# Patient Record
Sex: FEMALE | Race: ASIAN | NOT HISPANIC OR LATINO | Employment: FULL TIME | URBAN - METROPOLITAN AREA
[De-identification: names, ages, dates, MRNs, and addresses within clinical notes are randomized per-mention and may not be internally consistent; named-entity substitution may affect disease eponyms.]

---

## 2017-02-09 ENCOUNTER — OFFICE VISIT (OUTPATIENT)
Dept: URGENT CARE | Facility: PHYSICIAN GROUP | Age: 27
End: 2017-02-09
Payer: OTHER GOVERNMENT

## 2017-02-09 VITALS
BODY MASS INDEX: 23.18 KG/M2 | HEART RATE: 73 BPM | SYSTOLIC BLOOD PRESSURE: 110 MMHG | WEIGHT: 115 LBS | HEIGHT: 59 IN | OXYGEN SATURATION: 97 % | RESPIRATION RATE: 12 BRPM | DIASTOLIC BLOOD PRESSURE: 72 MMHG | TEMPERATURE: 97.8 F

## 2017-02-09 DIAGNOSIS — J02.9 PHARYNGITIS, UNSPECIFIED ETIOLOGY: ICD-10-CM

## 2017-02-09 DIAGNOSIS — J03.90 TONSILLITIS: ICD-10-CM

## 2017-02-09 PROCEDURE — 99214 OFFICE O/P EST MOD 30 MIN: CPT | Performed by: NURSE PRACTITIONER

## 2017-02-09 RX ORDER — AMOXICILLIN 875 MG/1
875 TABLET, COATED ORAL 2 TIMES DAILY
Qty: 20 TAB | Refills: 0 | Status: SHIPPED | OUTPATIENT
Start: 2017-02-09 | End: 2017-02-19

## 2017-02-09 ASSESSMENT — ENCOUNTER SYMPTOMS
CHILLS: 0
VOMITING: 0
HEADACHES: 0
MYALGIAS: 0
NAUSEA: 0
SORE THROAT: 1
COUGH: 0
FEVER: 0
SWOLLEN GLANDS: 1

## 2017-02-09 NOTE — PROGRESS NOTES
"Subjective:      Jesus Wyatt is a 26 y.o. female who presents with Pharyngitis    Past medical history: No history of chronic illness    Social history: Nonsmoker    Family history: No other ill family members at home    Patient is a 26-year-old female who presents today with complaint of sore throat, right side worse than left. She's had pain over the last week. She initially try taking ibuprofen for her symptoms and states that they almost resolved. Her symptoms then returned and are worse now. She denies any fevers with this. No other upper respiratory infections including nasal congestion, sinus pain or pressure. She states that she is having right ear pain with this. She has not noted any skin rashes, denies nausea vomiting or diarrhea.        Pharyngitis   This is a new problem. The current episode started in the past 7 days. The problem has been waxing and waning. The pain is worse on the right side. There has been no fever. Associated symptoms include swollen glands. Pertinent negatives include no congestion, coughing, headaches or vomiting. She has tried nothing for the symptoms. The treatment provided no relief.       Review of Systems   Constitutional: Positive for malaise/fatigue. Negative for fever and chills.   HENT: Positive for sore throat. Negative for congestion.    Respiratory: Negative for cough.    Gastrointestinal: Negative for nausea and vomiting.   Musculoskeletal: Negative for myalgias.   Skin: Negative.  Negative for rash.   Neurological: Negative for headaches.          Objective:     /72 mmHg  Pulse 73  Temp(Src) 36.6 °C (97.8 °F)  Resp 12  Ht 1.499 m (4' 11.02\")  Wt 52.164 kg (115 lb)  BMI 23.21 kg/m2  SpO2 97%     Physical Exam   Constitutional: She is oriented to person, place, and time. She appears well-developed and well-nourished. No distress.   HENT:   Right Ear: External ear normal.   Left Ear: External ear normal.   Tonsils are 2+, red, and injected. Right side " is worse than the left   Eyes: Conjunctivae and EOM are normal. Pupils are equal, round, and reactive to light. Right eye exhibits no discharge. Left eye exhibits no discharge. No scleral icterus.   Neck: Normal range of motion. Neck supple.   Cardiovascular: Normal rate and regular rhythm.    Pulmonary/Chest: Effort normal.   Lymphadenopathy:     She has cervical adenopathy.   Neurological: She is alert and oriented to person, place, and time.   Skin: Skin is warm and dry. She is not diaphoretic.   Psychiatric: She has a normal mood and affect. Her behavior is normal.   Vitals reviewed.              Assessment/Plan:     1. Tonsillitis    - amoxicillin (AMOXIL) 875 MG tablet; Take 1 Tab by mouth 2 times a day for 10 days.  Dispense: 20 Tab; Refill: 0  Warm salt water gargles  -Chloraseptic spray or lozenges as needed  -Tylenol or Motrin as needed  Push fluids  Follow-up if symptoms persist or worsen  -  2. Pharyngitis, unspecified etiology    - amoxicillin (AMOXIL) 875 MG tablet; Take 1 Tab by mouth 2 times a day for 10 days.  Dispense: 20 Tab; Refill: 0  Warm salt water gargles  -Chloraseptic spray or lozenges as needed  -Tylenol or Motrin as needed  Push fluids  Follow-up if symptoms persist or worsen  -

## 2017-02-09 NOTE — MR AVS SNAPSHOT
"        Jesus Wyatt   2017 2:20 PM   Office Visit   MRN: [id number]    Department:  [facility]   Dept Phone:  [phone]    Description:  Female : 1990   Provider:  Cathey J Hamman, A.P.N.           Reason for Visit     Pharyngitis swollen tonsils, right ear pain      Allergies as of 2017     No Known Allergies      You were diagnosed with     Tonsillitis   [[id number]]       Pharyngitis, unspecified etiology   [[id number]]         Vital Signs     Blood Pressure Pulse Temperature Respirations Height Weight    110/72 mmHg 73 36.6 °C (97.8 °F) 12 1.499 m (4' 11.02\") 52.164 kg (115 lb)    Body Mass Index Oxygen Saturation                23.21 kg/m2 97%          Basic Information     Date Of Birth Sex Race Ethnicity Preferred Language    1990 Female  Non- English      Health Maintenance        Date Due Completion Dates    IMM HEP B VACCINE (1 of 3 - Primary Series) 1991 ---    IMM HEP A VACCINE (1 of 2 - Standard Series) 1992 ---    IMM HPV VACCINE (1 of 3 - Female 3 Dose Series) 2002 ---    IMM VARICELLA (CHICKENPOX) VACCINE (1 of 2 - 2 Dose Adolescent Series) 2004 ---    IMM DTaP/Tdap/Td Vaccine (1 - Tdap) 2010 ---    PAP SMEAR 2012 ---    IMM INFLUENZA (1) 2016 ---            Current Immunizations     No immunizations on file.      Below and/or attached are the medications your provider expects you to take. Review all of your home medications and newly ordered medications with your provider and/or pharmacist. Follow medication instructions as directed by your provider and/or pharmacist. Please keep your medication list with you and share with your provider. Update the information when medications are discontinued, doses are changed, or new medications (including over-the-counter products) are added; and carry medication information at all times in the event of emergency situations     Allergies:  No Known Allergies          Medications  Valid as " of: February 09, 2017 -  2:36 PM    Generic Name Brand Name Tablet Size Instructions for use    Amoxicillin (Tab) AMOXIL 875 MG Take 1 Tab by mouth 2 times a day for 10 days.        .                 Medicines prescribed today were sent to:     [facility] [address] [place] [address], [place] - [address]    [address] [place] [address]    Phone: [phone] Fax: [phone]    Open 24 Hours?: No      Medication refill instructions:       If your prescription bottle indicates you have medication refills left, it is not necessary to call your provider’s office. Please contact your pharmacy and they will refill your medication.    If your prescription bottle indicates you do not have any refills left, you may request refills at any time through one of the following ways: The online [employer] system (except Urgent Care), by calling your provider’s office, or by asking your pharmacy to contact your provider’s office with a refill request. Medication refills are processed only during regular business hours and may not be available until the next business day. Your provider may request additional information or to have a follow-up visit with you prior to refilling your medication.   *Please Note: Medication refills are assigned a new Rx number when refilled electronically. Your pharmacy may indicate that no refills were authorized even though a new prescription for the same medication is available at the pharmacy. Please request the medicine by name with the pharmacy before contacting your provider for a refill.           [employer] Access Code: [email or web address]  Expires: 3/11/2017  2:36 PM    [employer]  A secure, online tool to manage your health information     [employer]’s [employer]® is a secure, online tool that connects you to your personalized health information from the privacy of your home -- day or night - making it very easy for you to manage your healthcare. Once the activation process is completed, you can even access your  medical information using the Alea bandar, which is available for free in the [employer] Bandar store or Google Play store.     Alea provides the following levels of access (as shown below):   My Chart Features   [facility] Doctor [facility]  Specialists [facility]  Urgent  Care Non-[facility]  Primary Care  Doctor   Email your healthcare team securely and privately 24/7 X X X    Manage appointments: schedule your next appointment; view details of past/upcoming appointments X      Request prescription refills. X      View recent personal medical records, including lab and immunizations X X X X   View health record, including health history, allergies, medications X X X X   Read reports about your outpatient visits, procedures, consult and ER notes X X X X   See your discharge summary, which is a recap of your hospital and/or ER visit that includes your diagnosis, lab results, and care plan. X X       How to register for Alea:  1. Go to  [email or web address].  2. Click on the Sign Up Now box, which takes you to the New Member Sign Up page. You will need to provide the following information:  a. Enter your Alea Access Code exactly as it appears at the top of this page. (You will not need to use this code after you’ve completed the sign-up process. If you do not sign up before the expiration date, you must request a new code.)   b. Enter your date of birth.   c. Enter your home email address.   d. Click Submit, and follow the next screen’s instructions.  3. Create a Alea ID. This will be your Alea login ID and cannot be changed, so think of one that is secure and easy to remember.  4. Create a Alea password. You can change your password at any time.  5. Enter your Password Reset Question and Answer. This can be used at a later time if you forget your password.   6. Enter your e-mail address. This allows you to receive e-mail notifications when new information is available in Alea.  7. Click Sign Up. You  can now view your health information.    For assistance activating your [employer] account, call [phone]

## 2017-09-28 DIAGNOSIS — B18.1 CHRONIC TYPE B VIRAL HEPATITIS (HCC): ICD-10-CM

## 2017-09-28 DIAGNOSIS — D56.3 BETA THALASSEMIA TRAIT: ICD-10-CM

## 2017-10-02 ENCOUNTER — TELEPHONE (OUTPATIENT)
Dept: HEMATOLOGY ONCOLOGY | Facility: MEDICAL CENTER | Age: 27
End: 2017-10-02

## 2017-10-02 NOTE — TELEPHONE ENCOUNTER
1st attempt to contact the patient-Left voicemail for patient requesting a return call to schedule New Hematology appointment with Dr. Morris. Ref: Rabia Keller Dx: Beta thalassemia trait/ elevated iron serum

## 2017-10-18 ENCOUNTER — HOSPITAL ENCOUNTER (OUTPATIENT)
Dept: LAB | Facility: MEDICAL CENTER | Age: 27
End: 2017-10-18
Attending: NURSE PRACTITIONER
Payer: OTHER GOVERNMENT

## 2017-10-18 LAB
HCG SERPL QL: NEGATIVE
INR PPP: 1 (ref 0.87–1.13)
PLATELET # BLD AUTO: 346 K/UL (ref 164–446)
PROTHROMBIN TIME: 13.5 SEC (ref 12–14.6)

## 2017-10-18 PROCEDURE — 84703 CHORIONIC GONADOTROPIN ASSAY: CPT

## 2017-10-18 PROCEDURE — 85049 AUTOMATED PLATELET COUNT: CPT

## 2017-10-18 PROCEDURE — 36415 COLL VENOUS BLD VENIPUNCTURE: CPT

## 2017-10-18 PROCEDURE — 85610 PROTHROMBIN TIME: CPT

## 2017-10-19 ENCOUNTER — APPOINTMENT (OUTPATIENT)
Dept: RADIOLOGY | Facility: MEDICAL CENTER | Age: 27
End: 2017-10-19
Attending: INTERNAL MEDICINE
Payer: OTHER GOVERNMENT

## 2017-10-19 ENCOUNTER — HOSPITAL ENCOUNTER (OUTPATIENT)
Facility: MEDICAL CENTER | Age: 27
End: 2017-10-19
Attending: INTERNAL MEDICINE | Admitting: INTERNAL MEDICINE
Payer: OTHER GOVERNMENT

## 2017-10-19 VITALS
RESPIRATION RATE: 15 BRPM | HEIGHT: 59 IN | SYSTOLIC BLOOD PRESSURE: 105 MMHG | DIASTOLIC BLOOD PRESSURE: 58 MMHG | BODY MASS INDEX: 27.42 KG/M2 | WEIGHT: 136 LBS | HEART RATE: 73 BPM | OXYGEN SATURATION: 100 %

## 2017-10-19 DIAGNOSIS — B18.1 CHRONIC HEPATITIS B (HCC): ICD-10-CM

## 2017-10-19 PROCEDURE — 47000 NEEDLE BIOPSY OF LIVER PERQ: CPT

## 2017-10-19 PROCEDURE — 160002 HCHG RECOVERY MINUTES (STAT)

## 2017-10-19 PROCEDURE — 88313 SPECIAL STAINS GROUP 2: CPT

## 2017-10-19 PROCEDURE — 700111 HCHG RX REV CODE 636 W/ 250 OVERRIDE (IP): Performed by: RADIOLOGY

## 2017-10-19 PROCEDURE — 4410208 IR-BIOPSY-LIVER PERCUTANEOUS(FL)

## 2017-10-19 PROCEDURE — 88307 TISSUE EXAM BY PATHOLOGIST: CPT

## 2017-10-19 RX ORDER — OXYCODONE HYDROCHLORIDE 5 MG/1
10 TABLET ORAL
Status: DISCONTINUED | OUTPATIENT
Start: 2017-10-19 | End: 2017-10-19 | Stop reason: HOSPADM

## 2017-10-19 RX ORDER — SODIUM CHLORIDE 9 MG/ML
500 INJECTION, SOLUTION INTRAVENOUS
Status: DISCONTINUED | OUTPATIENT
Start: 2017-10-19 | End: 2017-10-19 | Stop reason: HOSPADM

## 2017-10-19 RX ORDER — MIDAZOLAM HYDROCHLORIDE 1 MG/ML
.5-2 INJECTION INTRAMUSCULAR; INTRAVENOUS PRN
Status: DISCONTINUED | OUTPATIENT
Start: 2017-10-19 | End: 2017-10-19 | Stop reason: HOSPADM

## 2017-10-19 RX ORDER — OXYCODONE HYDROCHLORIDE 5 MG/1
5 TABLET ORAL
Status: DISCONTINUED | OUTPATIENT
Start: 2017-10-19 | End: 2017-10-19 | Stop reason: HOSPADM

## 2017-10-19 RX ORDER — ONDANSETRON 2 MG/ML
4 INJECTION INTRAMUSCULAR; INTRAVENOUS EVERY 8 HOURS PRN
Status: DISCONTINUED | OUTPATIENT
Start: 2017-10-19 | End: 2017-10-19 | Stop reason: HOSPADM

## 2017-10-19 RX ORDER — ONDANSETRON 2 MG/ML
4 INJECTION INTRAMUSCULAR; INTRAVENOUS PRN
Status: DISCONTINUED | OUTPATIENT
Start: 2017-10-19 | End: 2017-10-19 | Stop reason: HOSPADM

## 2017-10-19 RX ADMIN — FENTANYL CITRATE 25 MCG: 50 INJECTION, SOLUTION INTRAMUSCULAR; INTRAVENOUS at 14:10

## 2017-10-19 RX ADMIN — FENTANYL CITRATE 25 MCG: 50 INJECTION, SOLUTION INTRAMUSCULAR; INTRAVENOUS at 14:09

## 2017-10-19 ASSESSMENT — PAIN SCALES - GENERAL: PAINLEVEL_OUTOF10: 0

## 2017-10-19 NOTE — DISCHARGE INSTRUCTIONS
ACTIVITY: Rest and take it easy for the first 24 hours.  A responsible adult is recommended to remain with you during that time.  It is normal to feel sleepy.  We encourage you to not do anything that requires balance, judgment or coordination.    MILD FLU-LIKE SYMPTOMS ARE NORMAL. YOU MAY EXPERIENCE GENERALIZED MUSCLE ACHES, THROAT IRRITATION, HEADACHE AND/OR SOME NAUSEA.    FOR 24 HOURS DO NOT:  Drive, operate machinery or run household appliances.  Drink beer or alcoholic beverages.   Make important decisions or sign legal documents.    SPECIAL INSTRUCTIONS: If bleeding or pain occurs at site call 911 or go to nearest emergency room. Keep site clean and free from infection. Keep site covered till scab forms. No shower or baths for first 48 hours or until site has close. Sponge bath only     DIET: To avoid nausea, slowly advance diet as tolerated, avoiding spicy or greasy foods for the first day.  Add more substantial food to your diet according to your physician's instructions.  Babies can be fed formula or breast milk as soon as they are hungry.  INCREASE FLUIDS AND FIBER TO AVOID CONSTIPATION.    SURGICAL DRESSING/BATHING: No shower or baths for first 48 hours or until site has close. Sponge bath only       FOLLOW-UP APPOINTMENT:  A follow-up appointment should be arranged with your doctor in 1 week; call to schedule.    You should CALL YOUR PHYSICIAN if you develop:  Fever greater than 101 degrees F.  Pain not relieved by medication, or persistent nausea or vomiting.  Excessive bleeding (blood soaking through dressing) or unexpected drainage from the wound.  Extreme redness or swelling around the incision site, drainage of pus or foul smelling drainage.  Inability to urinate or empty your bladder within 8 hours.  Problems with breathing or chest pain.    You should call 911 if you develop problems with breathing or chest pain.  If you are unable to contact your doctor or surgical center, you should go to the  nearest emergency room or urgent care center.  Physician's telephone #: Anil Sanchez M.D.   [phone]    If any questions arise, call your doctor.  If your doctor is not available, please feel free to call the Surgical Center at [phone].  The Center is open Monday through Friday from 7AM to 7PM.  You can also call the HEALTH HOTLINE open 24 hours/day, 7 days/week and speak to a nurse at [phone], or toll free at [phone].    A registered nurse may call you a few days after your surgery to see how you are doing after your procedure.    MEDICATIONS: Resume taking daily medication.  Take prescribed pain medication with food.  If no medication is prescribed, you may take non-aspirin pain medication if needed.  PAIN MEDICATION CAN BE VERY CONSTIPATING.  Take a stool softener or laxative such as senokot, pericolace, or milk of magnesia if needed.      If your physician has prescribed pain medication that includes Acetaminophen (Tylenol), do not take additional Acetaminophen (Tylenol) while taking the prescribed medication.    Depression / Suicide Risk    As you are discharged from this [facility] facility, it is important to learn how to keep safe from harming yourself.    Recognize the warning signs:  · Abrupt changes in personality, positive or negative- including increase in energy   · Giving away possessions  · Change in eating patterns- significant weight changes-  positive or negative  · Change in sleeping patterns- unable to sleep or sleeping all the time   · Unwillingness or inability to communicate  · Depression  · Unusual sadness, discouragement and loneliness  · Talk of wanting to die  · Neglect of personal appearance   · Rebelliousness- reckless behavior  · Withdrawal from people/activities they love  · Confusion- inability to concentrate     If you or a loved one observes any of these behaviors or has concerns about self-harm, here's what you can do:  · Talk about it- your feelings  and reasons for harming yourself  · Remove any means that you might use to hurt yourself (examples: pills, rope, extension cords, firearm)  · Get professional help from the community (Mental Health, Substance Abuse, psychological counseling)  · Do not be alone:Call your Safe Contact- someone whom you trust who will be there for you.  · Call your local CRISIS HOTLINE 179-9671 or 843-796-9386  · Call your local Children's Mobile Crisis Response Team [place] (236) 466-2449 or [employer]  · Call the toll free National Suicide Prevention Hotlines   · National Suicide Prevention Lifeline 765-559-CGPK (8065)  · [employer] Line Network 800-SUICIDE (452-0506)      Liver Biopsy, Care After  These instructions give you information on caring for yourself after your procedure. Your doctor may also give you more specific instructions. Call your doctor if you have any problems or questions after your procedure.  HOME CARE  · Rest at home for 1-2 days or as told by your doctor.  · Have someone stay with you for at least 24 hours.  · Do not do these things in the first 24 hours:  ¨ Drive.  ¨ Use machinery.  ¨ Take care of other people.  ¨ Sign legal documents.  ¨ Take a bath or shower.  · There are many different ways to close and cover a cut (incision). For example, a cut can be closed with stitches, skin glue, or adhesive strips. Follow your doctor's instructions on:  ¨ Taking care of your cut.  ¨ Changing and removing your bandage (dressing).  ¨ Removing whatever was used to close your cut.  · Do not drink alcohol in the first week.  · Do not lift more than 5 pounds or play contact sports for the first 2 weeks.  · Take medicines only as told by your doctor. For 1 week, do not take medicine that has aspirin in it or medicines like ibuprofen.  · Get your test results.  GET HELP IF:  · A cut bleeds and leaves more than just a small spot of blood.  · A cut is red, puffs up (swells), or hurts more than  before.  · Fluid or something else comes from a cut.  · A cut smells bad.  · You have a fever or chills.  GET HELP RIGHT AWAY IF:  · You have swelling, bloating, or pain in your belly (abdomen).  · You get dizzy or faint.  · You have a rash.  · You feel sick to your stomach (nauseous) or throw up (vomit).  · You have trouble breathing, feel short of breath, or feel faint.  · Your chest hurts.  · You have problems talking or seeing.  · You have trouble balancing or moving your arms or legs.     This information is not intended to replace advice given to you by your health care provider. Make sure you discuss any questions you have with your health care provider.     Document Released: 09/26/2009 Document Revised: 01/08/2016 Document Reviewed: 02/13/2015  [employer] Interactive Patient Education ©2016 [employer]

## 2017-10-19 NOTE — OR SURGEON
Immediate Post- Operative Note        PostOp Diagnosis: LIVER DYSFUNCTION    Procedure(s):ULTRASOUND GUIDED LIVER BX      Estimated Blood Loss: Less than 5 ml        Complications: None            10/19/2017     2:03 PM     Shola Olivas

## 2017-10-19 NOTE — PROGRESS NOTES
OP IR RN note:    Liver BX by MD Olivas assisted by RT Tony,Right mid abdomen access site;  X 2 liver cores in formlain    Sent to lab   Patient tolerated procedure, hemodynamically stable; patient on monitor in OP IR pod 2  Pt to lay on right side for 1 hour then back for another hour till discharge

## 2017-10-19 NOTE — PROGRESS NOTES
Discharge instructions reviewed with patient and family, all questions answered. IV removed and belongings returned.  Patient ambulated out in a stable condition.       ACTIVITY: Rest and take it easy for the first 24 hours.  A responsible adult is recommended to remain with you during that time.  It is normal to feel sleepy.  We encourage you to not do anything that requires balance, judgment or coordination.    MILD FLU-LIKE SYMPTOMS ARE NORMAL. YOU MAY EXPERIENCE GENERALIZED MUSCLE ACHES, THROAT IRRITATION, HEADACHE AND/OR SOME NAUSEA.    FOR 24 HOURS DO NOT:  Drive, operate machinery or run household appliances.  Drink beer or alcoholic beverages.   Make important decisions or sign legal documents.    SPECIAL INSTRUCTIONS: If bleeding or pain occurs at site call 911 or go to nearest emergency room. Keep site clean and free from infection. Keep site covered till scab forms. No shower or baths for first 48 hours or until site has close. Sponge bath only     DIET: To avoid nausea, slowly advance diet as tolerated, avoiding spicy or greasy foods for the first day.  Add more substantial food to your diet according to your physician's instructions.  Babies can be fed formula or breast milk as soon as they are hungry.  INCREASE FLUIDS AND FIBER TO AVOID CONSTIPATION.    SURGICAL DRESSING/BATHING: No shower or baths for first 48 hours or until site has close. Sponge bath only       FOLLOW-UP APPOINTMENT:  A follow-up appointment should be arranged with your doctor in 1 week; call to schedule.    You should CALL YOUR PHYSICIAN if you develop:  Fever greater than 101 degrees F.  Pain not relieved by medication, or persistent nausea or vomiting.  Excessive bleeding (blood soaking through dressing) or unexpected drainage from the wound.  Extreme redness or swelling around the incision site, drainage of pus or foul smelling drainage.  Inability to urinate or empty your bladder within 8 hours.  Problems with breathing or  chest pain.    You should call 911 if you develop problems with breathing or chest pain.  If you are unable to contact your doctor or surgical center, you should go to the nearest emergency room or urgent care center.  Physician's telephone #: Anil Sanchez M.D.   [phone]    If any questions arise, call your doctor.  If your doctor is not available, please feel free to call the Surgical Center at [phone].  The Center is open Monday through Friday from 7AM to 7PM.  You can also call the HEALTH HOTLINE open 24 hours/day, 7 days/week and speak to a nurse at [phone], or toll free at [phone].    A registered nurse may call you a few days after your surgery to see how you are doing after your procedure.    MEDICATIONS: Resume taking daily medication.  Take prescribed pain medication with food.  If no medication is prescribed, you may take non-aspirin pain medication if needed.  PAIN MEDICATION CAN BE VERY CONSTIPATING.  Take a stool softener or laxative such as senokot, pericolace, or milk of magnesia if needed.      If your physician has prescribed pain medication that includes Acetaminophen (Tylenol), do not take additional Acetaminophen (Tylenol) while taking the prescribed medication.    Depression / Suicide Risk    As you are discharged from this [facility] facility, it is important to learn how to keep safe from harming yourself.    Recognize the warning signs:  · Abrupt changes in personality, positive or negative- including increase in energy   · Giving away possessions  · Change in eating patterns- significant weight changes-  positive or negative  · Change in sleeping patterns- unable to sleep or sleeping all the time   · Unwillingness or inability to communicate  · Depression  · Unusual sadness, discouragement and loneliness  · Talk of wanting to die  · Neglect of personal appearance   · Rebelliousness- reckless behavior  · Withdrawal from people/activities they love  · Confusion-  inability to concentrate     If you or a loved one observes any of these behaviors or has concerns about self-harm, here's what you can do:  · Talk about it- your feelings and reasons for harming yourself  · Remove any means that you might use to hurt yourself (examples: pills, rope, extension cords, firearm)  · Get professional help from the community (Mental Health, Substance Abuse, psychological counseling)  · Do not be alone:Call your Safe Contact- someone whom you trust who will be there for you.  · Call your local CRISIS HOTLINE 169-5060 or 704-204-0913  · Call your local Children's Mobile Crisis Response Team [place] (505) 215-3672 or www.[employer]  · Call the toll free National Suicide Prevention Hotlines   · National Suicide Prevention Lifeline 917-558-RNFN (2900)  · [employer] Line Network 800-SUICIDE (221-6767)      Liver Biopsy, Care After  These instructions give you information on caring for yourself after your procedure. Your doctor may also give you more specific instructions. Call your doctor if you have any problems or questions after your procedure.  HOME CARE  · Rest at home for 1-2 days or as told by your doctor.  · Have someone stay with you for at least 24 hours.  · Do not do these things in the first 24 hours:  ¨ Drive.  ¨ Use machinery.  ¨ Take care of other people.  ¨ Sign legal documents.  ¨ Take a bath or shower.  · There are many different ways to close and cover a cut (incision). For example, a cut can be closed with stitches, skin glue, or adhesive strips. Follow your doctor's instructions on:  ¨ Taking care of your cut.  ¨ Changing and removing your bandage (dressing).  ¨ Removing whatever was used to close your cut.  · Do not drink alcohol in the first week.  · Do not lift more than 5 pounds or play contact sports for the first 2 weeks.  · Take medicines only as told by your doctor. For 1 week, do not take medicine that has aspirin in it or medicines like  ibuprofen.  · Get your test results.  GET HELP IF:  · A cut bleeds and leaves more than just a small spot of blood.  · A cut is red, puffs up (swells), or hurts more than before.  · Fluid or something else comes from a cut.  · A cut smells bad.  · You have a fever or chills.  GET HELP RIGHT AWAY IF:  · You have swelling, bloating, or pain in your belly (abdomen).  · You get dizzy or faint.  · You have a rash.  · You feel sick to your stomach (nauseous) or throw up (vomit).  · You have trouble breathing, feel short of breath, or feel faint.  · Your chest hurts.  · You have problems talking or seeing.  · You have trouble balancing or moving your arms or legs.     This information is not intended to replace advice given to you by your health care provider. Make sure you discuss any questions you have with your health care provider.     Document Released: 09/26/2009 Document Revised: 01/08/2016 Document Reviewed: 02/13/2015  [employer] Interactive Patient Education ©2016 [employer]

## 2017-11-13 ENCOUNTER — APPOINTMENT (OUTPATIENT)
Dept: HEMATOLOGY ONCOLOGY | Facility: MEDICAL CENTER | Age: 27
End: 2017-11-13
Payer: OTHER GOVERNMENT

## 2017-11-29 ENCOUNTER — OFFICE VISIT (OUTPATIENT)
Dept: HEMATOLOGY ONCOLOGY | Facility: MEDICAL CENTER | Age: 27
End: 2017-11-29
Payer: OTHER GOVERNMENT

## 2017-11-29 VITALS
WEIGHT: 133.6 LBS | OXYGEN SATURATION: 97 % | DIASTOLIC BLOOD PRESSURE: 80 MMHG | SYSTOLIC BLOOD PRESSURE: 120 MMHG | HEIGHT: 59 IN | HEART RATE: 83 BPM | BODY MASS INDEX: 26.93 KG/M2 | TEMPERATURE: 98.3 F | RESPIRATION RATE: 16 BRPM

## 2017-11-29 DIAGNOSIS — D56.3 BETA THALASSEMIA MINOR: Primary | ICD-10-CM

## 2017-11-29 PROCEDURE — 99203 OFFICE O/P NEW LOW 30 MIN: CPT | Performed by: INTERNAL MEDICINE

## 2017-11-29 ASSESSMENT — PAIN SCALES - GENERAL: PAINLEVEL: NO PAIN

## 2017-11-30 NOTE — PROGRESS NOTES
Consult Note: Hematology    Date of consultation: 11/29/2017     Referring provider: Rabia Keller A.P*    Reason for consultation:   CC: Beta Thalassemia minor    History of presenting illness:   August 17, 2017 hemoglobin 10.6 and MCV 64  August 25, 2017 positive hepatitis B surface antibody, positive hepatitis E antibody, positive hepatitis B core antibody. Positive surface antigen  September 2, 2017 ferritin 107  September 7, 2017 hemoglobin 11.4 serum iron 217. Mentzer score 11, consistent with thalassemia trait  September 9, 2017 reticulocyte count 3%  September 22, 2017 hemoglobin electrophoresis shows hemoglobin of 3.9% hemoglobin A2 5.5% and hemoglobin is 19.6%. Electrophoresis pattern consistent with beta thalassemia minor. Genetic testing is negative for alpha thalassemia    All relevant records in electronic chart are reviewed and summarized above.    Jesus Wyatt  is a 27 y.o. year old female who is referred to the hematology clinic for newly discovered thalassemia minor.    Thalassemia  Location, blood  Severity, minor  Timing, constant  Modifying factors, oral iron  Quality, beta chain  Duration, diagnosis September 2017  Context, discovered on routine blood work  Associated factors, not associated with any chest pains or shortness of breath    Past Medical History:    Past Medical History:   Diagnosis Date   • Hepatitis B          Allergies:  Patient has no known allergies.    Medications:    No current outpatient prescriptions on file.     No current facility-administered medications for this visit.        Social History:     Social History     Social History   • Marital status:      Spouse name: N/A   • Number of children: N/A   • Years of education: N/A     Occupational History   • Not on file.     Social History Main Topics   • Smoking status: Never Smoker   • Smokeless tobacco: Never Used   • Alcohol use No   • Drug use: No   • Sexual activity: Not on file     Other Topics  "Concern   • Not on file     Social History Narrative   • No narrative on file       Family History:   No family history on file.    Review of Systems:  Constitutional: No fever, chills, weight loss ,malaise/fatigue.      All other review of systems are negative except what was mentioned above in the HPI.    Physical Exam:  Vitals:   /80   Pulse 83   Temp 36.8 °C (98.3 °F)   Resp 16   Ht 1.499 m (4' 11\")   Wt 60.6 kg (133 lb 9.6 oz)   SpO2 97%   BMI 26.98 kg/m²     General: Not in acute distress, alert and oriented x 3  HEENT: No pallor, icterus. Oropharynx clear.   Neck: Supple, no palpable masses.  Lymph nodes: No palpable cervical, supraclavicular, axillary or inguinal lymphadenopathy.    CVS: regular rate and rhythm, no rubs or gallops  RESP: Clear to auscultate bilaterally, no wheezing or crackles.   ABD: Soft, non tender, non distended, positive bowel sounds, no palpable organomegaly  EXT: No edema or cyanosis  CNS: Alert and oriented x3, No focal deficits.  Skin- No rash      Labs:   August 17, 2017 hemoglobin 10.6 and MCV 64  August 25, 2017 positive hepatitis B surface antibody, positive hepatitis E antibody, positive hepatitis B core antibody. Positive surface antigen  September 2, 2017 ferritin 107  September 7, 2017 hemoglobin 11.4 serum iron 217. Mentzer score 11, consistent with thalassemia trait  September 9, 2017 reticulocyte count 3%  September 22, 2017 hemoglobin electrophoresis shows hemoglobin of 3.9% hemoglobin A2 5.5% and hemoglobin is 19.6%. Electrophoresis pattern consistent with beta thalassemia minor. Genetic testing is negative for alpha thalassemia    Assessment and Plan:  -Beta Thalassemia   -Minor  -Asymptomatic  -Patient should not be on oral iron replacement unless there is clear documentation of iron deficiency. Patient has an elevated lifetime risk of iron overload  -Patient should be on oral folic acid supplementation daily    -Genetic counseling  -The patient is in the " process of starting a family. She has been advised to seek a genetic counselor. A referral is placed in the chart  -Her  will have to be tested to see if he has anemia.  -If the  is negative she will still have a chance of having a child with thalassemia minor.  -However the  was positive then there will be a risk of a child with thalassemia major         She agreed and verbalized her agreement and understanding with the current plan.  I answered all questions and concerns she has at this time.  Dear  Rabia Keller A.P*,  Thank you for allowing me to participate in her care.    All labs and/or imaging studies mentioned in the note above were reviewed with and explained to the patient as a pertain to medical decision making.    Please note that this dictation was created using voice recognition software. I have made every reasonable attempt to correct obvious errors, but I expect that there are errors of grammar and possibly content that I did not discover before finalizing the note.      SIGNATURES:  Trang Morris    CC:  EVERETT Argueta Christine D, A.P*

## 2018-02-22 ENCOUNTER — TELEPHONE (OUTPATIENT)
Dept: HEMATOLOGY ONCOLOGY | Facility: MEDICAL CENTER | Age: 28
End: 2018-02-22

## 2018-03-09 ENCOUNTER — HOSPITAL ENCOUNTER (OUTPATIENT)
Facility: MEDICAL CENTER | Age: 28
End: 2018-03-09
Attending: PREVENTIVE MEDICINE
Payer: COMMERCIAL

## 2018-03-09 ENCOUNTER — EMPLOYEE HEALTH (OUTPATIENT)
Dept: OCCUPATIONAL MEDICINE | Facility: CLINIC | Age: 28
End: 2018-03-09

## 2018-03-09 ENCOUNTER — EH NON-PROVIDER (OUTPATIENT)
Dept: OCCUPATIONAL MEDICINE | Facility: CLINIC | Age: 28
End: 2018-03-09

## 2018-03-09 VITALS
DIASTOLIC BLOOD PRESSURE: 68 MMHG | WEIGHT: 127 LBS | TEMPERATURE: 98.7 F | BODY MASS INDEX: 25.6 KG/M2 | RESPIRATION RATE: 14 BRPM | OXYGEN SATURATION: 96 % | HEIGHT: 59 IN | HEART RATE: 86 BPM | SYSTOLIC BLOOD PRESSURE: 110 MMHG

## 2018-03-09 DIAGNOSIS — Z02.1 PRE-EMPLOYMENT DRUG SCREENING: ICD-10-CM

## 2018-03-09 DIAGNOSIS — Z02.1 PRE-EMPLOYMENT HEALTH SCREENING EXAMINATION: ICD-10-CM

## 2018-03-09 PROCEDURE — 90715 TDAP VACCINE 7 YRS/> IM: CPT | Performed by: PREVENTIVE MEDICINE

## 2018-03-09 PROCEDURE — 86480 TB TEST CELL IMMUN MEASURE: CPT | Performed by: PREVENTIVE MEDICINE

## 2018-03-09 PROCEDURE — 90716 VAR VACCINE LIVE SUBQ: CPT | Performed by: PREVENTIVE MEDICINE

## 2018-03-09 PROCEDURE — 8915 PR COMPREHENSIVE PHYSICAL: Performed by: PREVENTIVE MEDICINE

## 2018-03-09 ASSESSMENT — VISUAL ACUITY
OS_CC: 20/20
OD_CC: 20/30

## 2018-03-11 LAB
M TB TUBERC IFN-G BLD QL: NEGATIVE
M TB TUBERC IFN-G/MITOGEN IGNF BLD: 0.02
M TB TUBERC IGNF/MITOGEN IGNF CONTROL: 57.4 [IU]/ML
MITOGEN IGNF BCKGRD COR BLD-ACNC: 0.02 [IU]/ML

## 2018-03-12 ENCOUNTER — DOCUMENTATION (OUTPATIENT)
Dept: OCCUPATIONAL MEDICINE | Facility: CLINIC | Age: 28
End: 2018-03-12

## 2018-03-12 NOTE — PROGRESS NOTES
Pre-employment and medical clearance reviewed and signed. Quantiferon result documented in immunizations. Document scanned and returned to MA.

## 2018-10-05 ENCOUNTER — TELEPHONE (OUTPATIENT)
Dept: MEDICAL GROUP | Age: 28
End: 2018-10-05

## 2018-10-05 ENCOUNTER — NON-PROVIDER VISIT (OUTPATIENT)
Dept: MEDICAL GROUP | Age: 28
End: 2018-10-05
Payer: OTHER GOVERNMENT

## 2018-10-05 DIAGNOSIS — Z23 NEED FOR VACCINATION: ICD-10-CM

## 2018-10-05 PROCEDURE — 90686 IIV4 VACC NO PRSV 0.5 ML IM: CPT | Performed by: FAMILY MEDICINE

## 2018-10-05 PROCEDURE — 90471 IMMUNIZATION ADMIN: CPT | Performed by: FAMILY MEDICINE

## 2018-10-05 NOTE — TELEPHONE ENCOUNTER
1. Caller Name: Jesus Wyatt                                           Call Back Number: [phone] (home)         Patient approves a detailed voicemail message: yes    2. SPECIFIC Action To Be Taken: Orders pending, please sign.    3. Diagnosis/Clinical Reason for Request: Flu vaccine     Patient here- flu to be signed

## 2018-10-05 NOTE — NON-PROVIDER
"Jesus Wyatt is a 28 y.o. female here for a non-provider visit for:   FLU    Reason for immunization: Annual Flu Vaccine  Immunization records indicate need for vaccine: Yes, confirmed with Epic  Minimum interval has been met for this vaccine: Yes  ABN completed: Yes    Order and dose verified by: MELINDA  VIS Dated  8/7/15 was given to patient: No  All IAC Questionnaire questions were answered \"No.\" declined     Patient tolerated injection and no adverse effects were observed or reported: Yes    Pt scheduled for next dose in series: Not Indicated  "

## 2019-02-10 ENCOUNTER — APPOINTMENT (OUTPATIENT)
Dept: RADIOLOGY | Facility: MEDICAL CENTER | Age: 29
End: 2019-02-10
Attending: INTERNAL MEDICINE
Payer: OTHER GOVERNMENT

## 2019-03-03 ENCOUNTER — HOSPITAL ENCOUNTER (OUTPATIENT)
Dept: RADIOLOGY | Facility: MEDICAL CENTER | Age: 29
End: 2019-03-03
Attending: INTERNAL MEDICINE
Payer: OTHER GOVERNMENT

## 2019-03-03 DIAGNOSIS — B18.1 HEPATITIS B CARRIER (HCC): ICD-10-CM

## 2019-03-03 PROCEDURE — 76700 US EXAM ABDOM COMPLETE: CPT

## 2019-06-05 ENCOUNTER — HOSPITAL ENCOUNTER (OUTPATIENT)
Dept: LAB | Facility: MEDICAL CENTER | Age: 29
End: 2019-06-05
Attending: INTERNAL MEDICINE
Payer: OTHER GOVERNMENT

## 2019-06-05 PROCEDURE — 36415 COLL VENOUS BLD VENIPUNCTURE: CPT

## 2019-06-05 PROCEDURE — 87517 HEPATITIS B DNA QUANT: CPT

## 2019-06-08 LAB
HBV DNA SERPL NAA+PROBE-ACNC: ABNORMAL IU/ML
HBV DNA SERPL NAA+PROBE-LOG IU: 3.03 LOG IU/ML
HBV DNA SERPL QL NAA+PROBE: DETECTED

## 2019-06-27 ENCOUNTER — NON-PROVIDER VISIT (OUTPATIENT)
Dept: MEDICAL GROUP | Age: 29
End: 2019-06-27
Payer: OTHER GOVERNMENT

## 2019-06-27 DIAGNOSIS — R30.9 URINARY PAIN: ICD-10-CM

## 2019-06-27 LAB
APPEARANCE UR: NORMAL
BILIRUB UR STRIP-MCNC: NORMAL MG/DL
COLOR UR AUTO: YELLOW
GLUCOSE UR STRIP.AUTO-MCNC: NORMAL MG/DL
KETONES UR STRIP.AUTO-MCNC: NORMAL MG/DL
LEUKOCYTE ESTERASE UR QL STRIP.AUTO: NORMAL
NITRITE UR QL STRIP.AUTO: NORMAL
PH UR STRIP.AUTO: 5.5 [PH] (ref 5–8)
PROT UR QL STRIP: NORMAL MG/DL
RBC UR QL AUTO: NORMAL
SP GR UR STRIP.AUTO: 1.02
UROBILINOGEN UR STRIP-MCNC: 0.2 MG/DL

## 2019-06-27 PROCEDURE — 96372 THER/PROPH/DIAG INJ SC/IM: CPT | Performed by: FAMILY MEDICINE

## 2019-06-27 PROCEDURE — 81002 URINALYSIS NONAUTO W/O SCOPE: CPT | Performed by: FAMILY MEDICINE

## 2019-09-19 DIAGNOSIS — Z23 NEED FOR VACCINATION: ICD-10-CM

## 2019-09-19 DIAGNOSIS — Z02.1 PRE-EMPLOYMENT EXAMINATION: ICD-10-CM

## 2019-09-26 ENCOUNTER — NON-PROVIDER VISIT (OUTPATIENT)
Dept: MEDICAL GROUP | Age: 29
End: 2019-09-26

## 2019-09-26 DIAGNOSIS — Z23 NEED FOR VACCINATION: ICD-10-CM

## 2019-09-26 PROCEDURE — 90686 IIV4 VACC NO PRSV 0.5 ML IM: CPT | Performed by: FAMILY MEDICINE

## 2019-09-26 NOTE — PROGRESS NOTES
"Jesus Wyatt is a 29 y.o. female here for a non-provider visit for:   FLU    Reason for immunization: Annual Flu Vaccine  Immunization records indicate need for vaccine: Yes, confirmed with Epic  Minimum interval has been met for this vaccine: Yes  ABN completed: Not Indicated    Order and dose verified by: PT   VIS Dated  8-15-19 was given to patient: Yes  All IAC Questionnaire questions were answered \"No.\"    Patient tolerated injection and no adverse effects were observed or reported: Yes    Pt scheduled for next dose in series: No    "

## 2019-09-28 LAB
GAMMA INTERFERON BACKGROUND BLD IA-ACNC: 0.07 IU/ML
M TB IFN-G BLD-IMP: NEGATIVE
M TB IFN-G CD4+ BCKGRND COR BLD-ACNC: 0.03 IU/ML
M TB IFN-G CD4+CD8+ BCKGRND COR BLD-ACNC: 0.04 IU/ML
MITOGEN IGNF BLD-ACNC: 6.1 IU/ML
QUANTIFERON INCUBATION 182889: NORMAL
SERVICE CMNT-IMP: NORMAL

## 2020-02-01 ENCOUNTER — HOSPITAL ENCOUNTER (OUTPATIENT)
Dept: LAB | Facility: MEDICAL CENTER | Age: 30
End: 2020-02-01
Attending: INTERNAL MEDICINE
Payer: OTHER GOVERNMENT

## 2020-02-01 LAB
ALBUMIN SERPL BCP-MCNC: 4.6 G/DL (ref 3.2–4.9)
ALBUMIN/GLOB SERPL: 1.4 G/DL
ALP SERPL-CCNC: 34 U/L (ref 30–99)
ALT SERPL-CCNC: 17 U/L (ref 2–50)
ANION GAP SERPL CALC-SCNC: 11 MMOL/L (ref 0–11.9)
AST SERPL-CCNC: 18 U/L (ref 12–45)
BASOPHILS # BLD AUTO: 0.2 % (ref 0–1.8)
BASOPHILS # BLD: 0.01 K/UL (ref 0–0.12)
BILIRUB SERPL-MCNC: 0.8 MG/DL (ref 0.1–1.5)
BUN SERPL-MCNC: 14 MG/DL (ref 8–22)
CALCIUM SERPL-MCNC: 9.7 MG/DL (ref 8.5–10.5)
CHLORIDE SERPL-SCNC: 106 MMOL/L (ref 96–112)
CO2 SERPL-SCNC: 21 MMOL/L (ref 20–33)
CREAT SERPL-MCNC: 0.58 MG/DL (ref 0.5–1.4)
EOSINOPHIL # BLD AUTO: 0.14 K/UL (ref 0–0.51)
EOSINOPHIL NFR BLD: 2.3 % (ref 0–6.9)
ERYTHROCYTE [DISTWIDTH] IN BLOOD BY AUTOMATED COUNT: 37.2 FL (ref 35.9–50)
GLOBULIN SER CALC-MCNC: 3.4 G/DL (ref 1.9–3.5)
GLUCOSE SERPL-MCNC: 91 MG/DL (ref 65–99)
HBV SURFACE AG SER QL: REACTIVE
HCT VFR BLD AUTO: 37.8 % (ref 37–47)
HGB BLD-MCNC: 12.2 G/DL (ref 12–16)
IMM GRANULOCYTES # BLD AUTO: 0.01 K/UL (ref 0–0.11)
IMM GRANULOCYTES NFR BLD AUTO: 0.2 % (ref 0–0.9)
LYMPHOCYTES # BLD AUTO: 2.13 K/UL (ref 1–4.8)
LYMPHOCYTES NFR BLD: 35.1 % (ref 22–41)
MCH RBC QN AUTO: 20.5 PG (ref 27–33)
MCHC RBC AUTO-ENTMCNC: 32.3 G/DL (ref 33.6–35)
MCV RBC AUTO: 63.6 FL (ref 81.4–97.8)
MONOCYTES # BLD AUTO: 0.43 K/UL (ref 0–0.85)
MONOCYTES NFR BLD AUTO: 7.1 % (ref 0–13.4)
NEUTROPHILS # BLD AUTO: 3.35 K/UL (ref 2–7.15)
NEUTROPHILS NFR BLD: 55.1 % (ref 44–72)
NRBC # BLD AUTO: 0 K/UL
NRBC BLD-RTO: 0 /100 WBC
PLATELET # BLD AUTO: 322 K/UL (ref 164–446)
POTASSIUM SERPL-SCNC: 3.6 MMOL/L (ref 3.6–5.5)
PROT SERPL-MCNC: 8 G/DL (ref 6–8.2)
RBC # BLD AUTO: 5.94 M/UL (ref 4.2–5.4)
SODIUM SERPL-SCNC: 138 MMOL/L (ref 135–145)
WBC # BLD AUTO: 6.1 K/UL (ref 4.8–10.8)

## 2020-02-01 PROCEDURE — 80053 COMPREHEN METABOLIC PANEL: CPT

## 2020-02-01 PROCEDURE — 87340 HEPATITIS B SURFACE AG IA: CPT

## 2020-02-01 PROCEDURE — 87341 HEP B SURFACE AG NEUTRLZJ IA: CPT

## 2020-02-01 PROCEDURE — 36415 COLL VENOUS BLD VENIPUNCTURE: CPT

## 2020-02-01 PROCEDURE — 87517 HEPATITIS B DNA QUANT: CPT

## 2020-02-01 PROCEDURE — 87350 HEPATITIS BE AG IA: CPT

## 2020-02-01 PROCEDURE — 85025 COMPLETE CBC W/AUTO DIFF WBC: CPT

## 2020-02-03 LAB
HBV E AG SERPL QL IA: NEGATIVE
HBV SURFACE AG SERPL QL NT: POSITIVE

## 2020-02-04 LAB
HBV DNA SERPL NAA+PROBE-ACNC: 481 [IU]/ML
HBV DNA SERPL NAA+PROBE-LOG IU: 2.68 LOG IU/ML
HBV DNA SERPL QL NAA+PROBE: DETECTED

## 2020-02-08 ENCOUNTER — HOSPITAL ENCOUNTER (OUTPATIENT)
Dept: RADIOLOGY | Facility: MEDICAL CENTER | Age: 30
End: 2020-02-08
Attending: INTERNAL MEDICINE
Payer: OTHER GOVERNMENT

## 2020-02-08 DIAGNOSIS — D56.9 THALASSEMIA SYNDROME: ICD-10-CM

## 2020-02-08 DIAGNOSIS — B18.1 HEPATITIS B CARRIER (HCC): ICD-10-CM

## 2020-02-08 PROCEDURE — 76700 US EXAM ABDOM COMPLETE: CPT

## 2020-06-19 ENCOUNTER — TELEPHONE (OUTPATIENT)
Dept: SCHEDULING | Facility: IMAGING CENTER | Age: 30
End: 2020-06-19

## 2020-07-17 ENCOUNTER — HOSPITAL ENCOUNTER (OUTPATIENT)
Facility: MEDICAL CENTER | Age: 30
End: 2020-07-17
Attending: INTERNAL MEDICINE
Payer: OTHER GOVERNMENT

## 2020-07-17 ENCOUNTER — OFFICE VISIT (OUTPATIENT)
Dept: MEDICAL GROUP | Facility: MEDICAL CENTER | Age: 30
End: 2020-07-17
Payer: OTHER GOVERNMENT

## 2020-07-17 VITALS
SYSTOLIC BLOOD PRESSURE: 128 MMHG | OXYGEN SATURATION: 100 % | TEMPERATURE: 98.4 F | HEART RATE: 84 BPM | HEIGHT: 59 IN | DIASTOLIC BLOOD PRESSURE: 64 MMHG | BODY MASS INDEX: 25.8 KG/M2 | WEIGHT: 128 LBS

## 2020-07-17 DIAGNOSIS — Z12.4 PAP SMEAR FOR CERVICAL CANCER SCREENING: ICD-10-CM

## 2020-07-17 DIAGNOSIS — Z01.419 WELL WOMAN EXAM: ICD-10-CM

## 2020-07-17 DIAGNOSIS — N92.0 MENORRHAGIA WITH REGULAR CYCLE: ICD-10-CM

## 2020-07-17 PROBLEM — B18.1 CHRONIC HEPATITIS B (HCC): Status: ACTIVE | Noted: 2020-07-17

## 2020-07-17 PROBLEM — D56.3 THALASSEMIA MINOR: Status: ACTIVE | Noted: 2020-07-17

## 2020-07-17 PROCEDURE — 99385 PREV VISIT NEW AGE 18-39: CPT | Performed by: INTERNAL MEDICINE

## 2020-07-17 PROCEDURE — 88175 CYTOPATH C/V AUTO FLUID REDO: CPT

## 2020-07-17 ASSESSMENT — FIBROSIS 4 INDEX: FIB4 SCORE: 0.39

## 2020-07-17 ASSESSMENT — PATIENT HEALTH QUESTIONNAIRE - PHQ9: CLINICAL INTERPRETATION OF PHQ2 SCORE: 0

## 2020-07-17 NOTE — PROGRESS NOTES
"Subjective:     CC:   Well female exam    HPI:   Jesus Wyatt is a 30 y.o. female who presents for well female exam. Her  and she are trying to get pregnant for 2 years. 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 She would like to have PAP smear today.    She has thalassemia minor and chronic inactive hep B, both are stable. No other health concern today.     Patient has GYN provider: No   Last Pap Smear: 3 yrs ago, normal    H/O Abnormal Pap: No     Last Tdap: 3/9/2018   Received HPV series: Aged out       Hx STDs: No  Birth control: no  Menses every month with 7 days with moderate to heavy bleeding.  Denies cramping. has mild back pain before menstruation. LMP a few days ago.  No significant bloating/fluid retention, pelvic pain, or dyspareunia. No abnormal vaginal discharge.  No breast tenderness, mass, nipple discharge     OB History   No obstetric history on file.      She  reports being sexually active and has had partner(s) who are Male.    She  has a past medical history of Hepatitis B.  She  has no past surgical history on file.    Family History   Problem Relation Age of Onset   • Hypertension Mother    • Diabetes Father         2     Social History     Tobacco Use   • Smoking status: Never Smoker   • Smokeless tobacco: Never Used   Substance Use Topics   • Alcohol use: No   • Drug use: No       Patient Active Problem List    Diagnosis Date Noted   • Thalassemia minor 07/17/2020   • Chronic hepatitis B (HCC) 07/17/2020     No current outpatient medications on file.     No current facility-administered medications for this visit.      No Known Allergies       Objective:   /64 (BP Location: Right arm, Patient Position: Sitting)   Pulse 84   Temp 36.9 °C (98.4 °F)   Ht 1.499 m (4' 11\")   Wt 58.1 kg (128 lb)   SpO2 100%   BMI 25.85 kg/m²     Wt Readings from Last 4 Encounters:   07/17/20 58.1 kg (128 lb)   11/29/17 60.6 kg (133 lb " 9.6 oz)   10/19/17 61.7 kg (136 lb)   02/09/17 52.2 kg (115 lb)      Physical Exam:  Constitutional: Well-developed and well-nourished. Not diaphoretic. No distress.   Skin: Skin is warm and dry. No rash noted.  Head: Atraumatic without lesions.  Eyes: Conjunctivae and extraocular motions are normal. Pupils are equal, round, and reactive to light. No scleral icterus.   Ears:  External ears unremarkable.   Nose: Nares patent.  No discharge.   Mouth/Throat: Tongue normal. Oropharynx is clear and moist. Posterior pharynx without erythema or exudates.  Neck: Supple, trachea midline. Normal range of motion. No thyromegaly present. No lymphadenopathy--cervical or supraclavicular.  Cardiovascular: Regular rate and rhythm, S1 and S2 without murmur, rubs, or gallops.    Abdomen: Soft, non tender, and without distention. Active bowel sounds in all four quadrants. No rebound, guarding, masses or HSM.  Extremities: No cyanosis, clubbing, erythema, nor edema.     Neurological: Alert and oriented x 3. Grossly non-focal. Strength and sensation grossly intact.    Psychiatric:  Behavior, mood, and affect are appropriate.      Assessment and Plan:     1. Well woman exam    2. Pap smear for cervical cancer screening    3. Menorrhagia with regular cycle    Perineum and external genitalia normal without abnormal lesions. Vagina with normal and physiologic discharge. Cervix smooth without visible lesions. There is scant red blood discharge.  Bimanual exam without adnexal masses or cervical motion tenderness.     Pap smear was sent to the lab in liquid preparation.    Follow up: based on result.

## 2020-07-20 LAB — CYTOLOGY REG CYTOL: NORMAL

## 2020-08-01 ENCOUNTER — HOSPITAL ENCOUNTER (OUTPATIENT)
Dept: LAB | Facility: MEDICAL CENTER | Age: 30
End: 2020-08-01
Attending: INTERNAL MEDICINE
Payer: OTHER GOVERNMENT

## 2020-08-01 DIAGNOSIS — Z11.3 ROUTINE SCREENING FOR STI (SEXUALLY TRANSMITTED INFECTION): ICD-10-CM

## 2020-08-01 LAB
HAV IGM SERPL QL IA: ABNORMAL
HBV CORE IGM SER QL: ABNORMAL
HBV SURFACE AG SER QL: REACTIVE
HCV AB SER QL: ABNORMAL
HIV 1+2 AB+HIV1 P24 AG SERPL QL IA: NORMAL

## 2020-08-01 PROCEDURE — 87389 HIV-1 AG W/HIV-1&-2 AB AG IA: CPT

## 2020-08-01 PROCEDURE — 80074 ACUTE HEPATITIS PANEL: CPT

## 2020-08-01 PROCEDURE — 87491 CHLMYD TRACH DNA AMP PROBE: CPT

## 2020-08-01 PROCEDURE — 87591 N.GONORRHOEAE DNA AMP PROB: CPT

## 2020-08-01 PROCEDURE — 87341 HEP B SURFACE AG NEUTRLZJ IA: CPT

## 2020-08-01 PROCEDURE — 86780 TREPONEMA PALLIDUM: CPT

## 2020-08-01 PROCEDURE — 36415 COLL VENOUS BLD VENIPUNCTURE: CPT

## 2020-08-02 LAB
C TRACH DNA SPEC QL NAA+PROBE: NEGATIVE
N GONORRHOEA DNA SPEC QL NAA+PROBE: NEGATIVE
SPECIMEN SOURCE: NORMAL

## 2020-08-04 LAB
HBV SURFACE AG SERPL QL NT: POSITIVE
T PALLIDUM AB SER QL AGGL: NON REACTIVE

## 2020-09-30 ENCOUNTER — HOSPITAL ENCOUNTER (OUTPATIENT)
Dept: RADIOLOGY | Facility: MEDICAL CENTER | Age: 30
End: 2020-09-30
Attending: INTERNAL MEDICINE
Payer: OTHER GOVERNMENT

## 2020-09-30 DIAGNOSIS — D56.9 THALASSEMIA, UNSPECIFIED TYPE: ICD-10-CM

## 2020-09-30 DIAGNOSIS — B18.1 CHRONIC HEPATITIS B (HCC): ICD-10-CM

## 2020-09-30 PROCEDURE — 76700 US EXAM ABDOM COMPLETE: CPT

## 2020-12-07 ENCOUNTER — PATIENT MESSAGE (OUTPATIENT)
Dept: MEDICAL GROUP | Facility: MEDICAL CENTER | Age: 30
End: 2020-12-07

## 2020-12-07 DIAGNOSIS — Z34.91 PRENATAL CARE IN FIRST TRIMESTER: ICD-10-CM

## 2020-12-07 DIAGNOSIS — N92.0 MENORRHAGIA WITH REGULAR CYCLE: ICD-10-CM

## 2020-12-15 ENCOUNTER — APPOINTMENT (OUTPATIENT)
Dept: MEDICAL GROUP | Facility: MEDICAL CENTER | Age: 30
End: 2020-12-15
Payer: OTHER GOVERNMENT

## 2021-01-19 ENCOUNTER — TELEPHONE (OUTPATIENT)
Dept: MEDICAL GROUP | Facility: MEDICAL CENTER | Age: 31
End: 2021-01-19

## 2021-01-19 NOTE — TELEPHONE ENCOUNTER
ESTABLISHED PATIENT PRE-VISIT PLANNING     Patient was NOT contacted to complete PVP.     Note: Patient will not be contacted if there is no indication to call.     1.  Reviewed notes from the last few office visits within the medical group: Yes    2.  If any orders were placed at last visit or intended to be done for this visit (i.e. 6 mos follow-up), do we have Results/Consult Notes?         •  Labs - Labs ordered, completed on 08/04/2020 and results are in chart.  Note: If patient appointment is for lab review and patient did not complete labs, check with provider if OK to reschedule patient until labs completed.       •  Imaging - Imaging was not ordered at last office visit.       •  Referrals - Referral ordered, patient has NOT been seen.    3. Is this appointment scheduled as a Hospital Follow-Up? No    4.  Immunizations were updated in Epic using Reconcile Outside Information activity? Yes    5.  Patient is due for the following Health Maintenance Topics:   Health Maintenance Due   Topic Date Due   • IMM INFLUENZA (1) 09/01/2020       6.  AHA (Pulse8) form printed for Provider? N/A

## 2021-01-20 ENCOUNTER — OFFICE VISIT (OUTPATIENT)
Dept: MEDICAL GROUP | Facility: MEDICAL CENTER | Age: 31
End: 2021-01-20
Payer: OTHER GOVERNMENT

## 2021-01-20 VITALS
TEMPERATURE: 97.9 F | DIASTOLIC BLOOD PRESSURE: 70 MMHG | BODY MASS INDEX: 26 KG/M2 | OXYGEN SATURATION: 98 % | SYSTOLIC BLOOD PRESSURE: 112 MMHG | HEIGHT: 59 IN | WEIGHT: 129 LBS | HEART RATE: 91 BPM

## 2021-01-20 DIAGNOSIS — B18.1 CHRONIC HEPATITIS B (HCC): ICD-10-CM

## 2021-01-20 DIAGNOSIS — Z23 NEED FOR VACCINATION: ICD-10-CM

## 2021-01-20 DIAGNOSIS — Z00.00 ENCOUNTER FOR PHYSICAL EXAMINATION: ICD-10-CM

## 2021-01-20 DIAGNOSIS — D56.3 THALASSEMIA MINOR: ICD-10-CM

## 2021-01-20 PROBLEM — N92.0 MENORRHAGIA WITH REGULAR CYCLE: Status: RESOLVED | Noted: 2020-07-17 | Resolved: 2021-01-20

## 2021-01-20 PROCEDURE — 99395 PREV VISIT EST AGE 18-39: CPT | Mod: 25 | Performed by: INTERNAL MEDICINE

## 2021-01-20 PROCEDURE — 90686 IIV4 VACC NO PRSV 0.5 ML IM: CPT | Performed by: INTERNAL MEDICINE

## 2021-01-20 PROCEDURE — 90471 IMMUNIZATION ADMIN: CPT | Performed by: INTERNAL MEDICINE

## 2021-01-20 ASSESSMENT — FIBROSIS 4 INDEX: FIB4 SCORE: 0.39

## 2021-01-20 ASSESSMENT — PATIENT HEALTH QUESTIONNAIRE - PHQ9: CLINICAL INTERPRETATION OF PHQ2 SCORE: 0

## 2021-01-20 NOTE — PROGRESS NOTES
Subjective:     CC: Physical exam before traveling      HPI:   Jesus Wyatt is a 30 y.o. female who presents for annual exam        Patient has GYN provider: Yes  Last Pap Smear: 2020   H/O Abnormal Pap: No  Last Mammogram: NA  Last Bone Density Test: NA  Last Colorectal Cancer Screening: NA  Last Tdap: 3/9/2018  Received HPV series: Aged out    Exercise: ambulatory  Diet:  healthy    Pregnant in 2nd trimester, 13th week. . OB - Dr. Hui  She is having nausea related to pregnancy, not intractable, able to tolerate regular diet.  Otherwise, pregnancy is getting along well without any complication.  She had OB ultrasound at 8 weeks, said to have no abnormal findings.        She  reports being sexually active and has had partner(s) who are Male.    She  has a past medical history of Hepatitis B.  She  has no past surgical history on file.    Family History   Problem Relation Age of Onset   • Hypertension Mother    • Diabetes Father         2     Social History     Tobacco Use   • Smoking status: Never Smoker   • Smokeless tobacco: Never Used   Substance Use Topics   • Alcohol use: No   • Drug use: No       Patient Active Problem List    Diagnosis Date Noted   • Thalassemia minor 2020   • Chronic hepatitis B (HCC) 2020     Current Outpatient Medications   Medication Sig Dispense Refill   • Prenatal MV-Min-Fe Fum-FA-DHA (PRENATAL 1 PO)        No current facility-administered medications for this visit.      Allergies   Allergen Reactions   • Latex Itching       Review of Systems    Constitutional: Negative for fever, chills and malaise/fatigue.   HENT: Negative for congestion.    Eyes: Negative for pain.   Respiratory: Negative for cough and shortness of breath.    Cardiovascular: Negative for chest pain and leg swelling.   Gastrointestinal: Negative for  abdominal pain, constipation and diarrhea.   Genitourinary: Negative for dysuria and hematuria.   Skin: Negative for rash.  "  Neurological: Negative for dizziness, focal weakness and headaches.   Endo/Heme/Allergies: Does not bruise/bleed easily.   Psychiatric/Behavioral: Negative for depression.  The patient is not nervous/anxious.      Objective:   /70   Pulse 91   Temp 36.6 °C (97.9 °F) (Temporal)   Ht 1.499 m (4' 11\")   Wt 58.5 kg (129 lb)   SpO2 98%   BMI 26.05 kg/m²     Wt Readings from Last 4 Encounters:   01/20/21 58.5 kg (129 lb)   07/17/20 58.1 kg (128 lb)   11/29/17 60.6 kg (133 lb 9.6 oz)   10/19/17 61.7 kg (136 lb)        Physical Exam:   Constitutional: Well-developed and well-nourished. Not diaphoretic. No distress.   Skin: Skin is warm and dry. No rash noted.  Head: Atraumatic without lesions.  Eyes: Conjunctivae and extraocular motions are normal. Pupils are equal, round, and reactive to light. No scleral icterus.   Ears:  External ears unremarkable. Tympanic membranes clear and intact.  Nose: Nares patent. Septum midline. Turbinates without erythema nor edema. No discharge.   Mouth/Throat: Tongue normal. Oropharynx is clear and moist. Posterior pharynx without erythema or exudates.  Neck: Supple, trachea midline. Normal range of motion. No thyromegaly present. No lymphadenopathy--cervical or supraclavicular.  Cardiovascular: Regular rate and rhythm, S1 and S2 without murmur, rubs, or gallops.    Respiratory: Effort normal. Clear to auscultation throughout. No adventitious sounds.   Abdomen: Soft, non tender, and without distention. Active bowel sounds in all four quadrants. No rebound, guarding, masses or HSM.  Extremities: No clubbing, erythema, nor edema.   Musculoskeletal: All major joints AROM full in all directions without pain.  Neurological: Alert and oriented x 3. Grossly non-focal. Strength and sensation intact. DTRs 2+ at biceps and quardriceps and symmetric.   Psychiatric:  Behavior, mood, and affect are appropriate.    Assessment and Plan:     1. Encounter for physical examination  3. Thalassemia " minor  4. Chronic hepatitis B (HCC)  - No abnormal physical findings today.  She is doing blood test ordered by OB on Friday.   - She had hepatitis B viral load [facility] around October, 2020 [facility], results are not available to me. So far, no treatment required due to low viral load.  - She is medically stable for traveling.  Educated on frequent mobilization during long flight to lower DVT risk. Other pregnancy care instructions by her OB.    2. Need for vaccination  - updated Influenza Vaccine today. All other immunizations are up-to-date.  Immunization record given.     Follow-up: She is staying in [place] for 3 years. She can follow up here for wellness exam when she is back.

## 2021-03-09 ENCOUNTER — PATIENT MESSAGE (OUTPATIENT)
Dept: MEDICAL GROUP | Facility: MEDICAL CENTER | Age: 31
End: 2021-03-09

## 2021-03-09 DIAGNOSIS — B18.1 CHRONIC HEPATITIS B (HCC): ICD-10-CM
